# Patient Record
Sex: MALE | Race: BLACK OR AFRICAN AMERICAN | NOT HISPANIC OR LATINO
[De-identification: names, ages, dates, MRNs, and addresses within clinical notes are randomized per-mention and may not be internally consistent; named-entity substitution may affect disease eponyms.]

---

## 2021-01-19 ENCOUNTER — TRANSCRIPTION ENCOUNTER (OUTPATIENT)
Age: 66
End: 2021-01-19

## 2021-01-26 PROBLEM — Z00.00 ENCOUNTER FOR PREVENTIVE HEALTH EXAMINATION: Status: ACTIVE | Noted: 2021-01-26

## 2021-01-27 ENCOUNTER — APPOINTMENT (OUTPATIENT)
Dept: NEUROSURGERY | Facility: CLINIC | Age: 66
End: 2021-01-27

## 2021-01-27 VITALS
SYSTOLIC BLOOD PRESSURE: 166 MMHG | WEIGHT: 219 LBS | HEIGHT: 69 IN | DIASTOLIC BLOOD PRESSURE: 81 MMHG | HEART RATE: 73 BPM | BODY MASS INDEX: 32.44 KG/M2

## 2021-01-27 VITALS — TEMPERATURE: 97.1 F

## 2021-01-29 ENCOUNTER — APPOINTMENT (OUTPATIENT)
Dept: NEUROSURGERY | Facility: CLINIC | Age: 66
End: 2021-01-29
Payer: MEDICARE

## 2021-01-29 VITALS
WEIGHT: 219 LBS | BODY MASS INDEX: 32.44 KG/M2 | SYSTOLIC BLOOD PRESSURE: 146 MMHG | HEIGHT: 69 IN | HEART RATE: 70 BPM | DIASTOLIC BLOOD PRESSURE: 83 MMHG

## 2021-01-29 PROCEDURE — 99204 OFFICE O/P NEW MOD 45 MIN: CPT

## 2021-01-29 PROCEDURE — 99072 ADDL SUPL MATRL&STAF TM PHE: CPT

## 2021-01-29 RX ORDER — GABAPENTIN 100 MG/1
100 CAPSULE ORAL
Qty: 60 | Refills: 0 | Status: ACTIVE | COMMUNITY
Start: 2021-01-22

## 2021-01-29 RX ORDER — AZELASTINE HYDROCHLORIDE 137 UG/1
0.1 SPRAY, METERED NASAL
Qty: 90 | Refills: 0 | Status: ACTIVE | COMMUNITY
Start: 2020-09-23

## 2021-01-29 RX ORDER — FLUTICASONE PROPIONATE 50 UG/1
50 SPRAY, METERED NASAL
Qty: 48 | Refills: 0 | Status: ACTIVE | COMMUNITY
Start: 2020-09-23

## 2021-01-29 RX ORDER — CYCLOBENZAPRINE HYDROCHLORIDE 5 MG/1
5 TABLET, FILM COATED ORAL
Qty: 10 | Refills: 0 | Status: ACTIVE | COMMUNITY
Start: 2021-01-19

## 2021-01-29 RX ORDER — METHYLPREDNISOLONE 4 MG/1
4 TABLET ORAL
Qty: 21 | Refills: 0 | Status: ACTIVE | COMMUNITY
Start: 2021-01-19

## 2021-01-29 NOTE — HISTORY OF PRESENT ILLNESS
[Back] : back [___ wks] : [unfilled] week(s) ago [9] : a maximum pain level of 9/10 [Sharp] : sharp [Right] : right [Anterior] : anterior aspect of the [Calf] : calf [Numbness] : numbness [Laying] : laying [Standing] : standing [Walking] : walking [Sitting] : sitting [Insomnia] : insomnia [Gait Dysfunction] : gait dysfunction [Medications] : medications [FreeTextEntry2] : right shin [FreeTextEntry6] : Ibuprofen, Gabapentin, Cyclobenzaprine, Medrol pack

## 2021-01-29 NOTE — ASSESSMENT
[FreeTextEntry1] : 65 year old male with low back and lumbar radicular pain.  As he has not had any imaging of his spine, I will order an MRI lumbar spine to help delineate the exact etiology of his pain.  We will also begin a trial of medically supervised PT.  He will return to see me once he has completed the MRI to review the results and discuss his further treatment options.

## 2021-01-29 NOTE — PHYSICAL EXAM
Called patient, no answer could not leave VM [General Appearance - Alert] : alert [Mood] : the mood was normal [4] : S1 ankle flexors 4/5 [5] : S1 ankle flexors 5/5 [1+] : Patella right 1+ [2+] : Ankle jerk left 2+ [FreeTextEntry7] : diminished to LT over right L4 distribution [Straight-Leg Raise Test - Left] : straight leg raise of the left leg was negative [Straight-Leg Raise Test - Right] : straight leg raise  of the right leg was negative [Able to toe walk] : the patient was able to toe walk [Able to heel walk] : the patient was able to heel walk [No Spinal Tenderness] : no spinal tenderness [] : no rash

## 2021-02-12 ENCOUNTER — OUTPATIENT (OUTPATIENT)
Dept: OUTPATIENT SERVICES | Facility: HOSPITAL | Age: 66
LOS: 1 days | End: 2021-02-12
Payer: MEDICARE

## 2021-02-12 ENCOUNTER — APPOINTMENT (OUTPATIENT)
Dept: MRI IMAGING | Facility: CLINIC | Age: 66
End: 2021-02-12

## 2021-02-12 DIAGNOSIS — M54.16 RADICULOPATHY, LUMBAR REGION: ICD-10-CM

## 2021-02-12 PROCEDURE — 72148 MRI LUMBAR SPINE W/O DYE: CPT

## 2021-02-12 PROCEDURE — 72148 MRI LUMBAR SPINE W/O DYE: CPT | Mod: 26

## 2021-02-23 ENCOUNTER — APPOINTMENT (OUTPATIENT)
Dept: NEUROSURGERY | Facility: CLINIC | Age: 66
End: 2021-02-23
Payer: MEDICARE

## 2021-02-23 VITALS
BODY MASS INDEX: 31.25 KG/M2 | WEIGHT: 211 LBS | HEART RATE: 71 BPM | SYSTOLIC BLOOD PRESSURE: 123 MMHG | DIASTOLIC BLOOD PRESSURE: 69 MMHG | HEIGHT: 69 IN

## 2021-02-23 PROCEDURE — 99214 OFFICE O/P EST MOD 30 MIN: CPT

## 2021-02-23 PROCEDURE — 99072 ADDL SUPL MATRL&STAF TM PHE: CPT

## 2021-02-23 NOTE — DATA REVIEWED
[de-identified] : \par   MR Lumbar Spine No Cont             Final\par \par No Documents Attached\par \par \par \par \par   EXAM:  MR SPINE LUMBAR\par \par \par PROCEDURE DATE:  02/12/2021\par \par \par \par INTERPRETATION:  LUMBOSACRAL SPINE MRI\par \par CLINICAL INFORMATION: Low back pain and radiculopathy.\par TECHNIQUE: Multiplanar, multisequence MR imaging was obtained of the lumbosacral spine.\par FINDINGS:\par \par DISC LEVEL EVALUATION:\par \par L1/L2: There is mild disc degeneration and minimal disc bulging without central canal or foraminal narrowing.\par L2/L3: There is mild disc degeneration with mild to moderate circumferential disc bulging resulting in indentation upon the ventral thecal sac without significant central canal stenosis. There is mild left foraminal narrowing and mild facet arthrosis.\par L3/L4: Moderate to severe facet arthrosis is present with grade 1 anterolisthesis. There is uncovering of the posterior aspect of the disc along with a broad-based posterior disc protrusion resulting in moderate to severe central canal stenosis. There is moderate bilateral foraminal narrowing. Mild endplate marrow edema is present.\par L4/L5: There is moderate facet arthrosis with grade 1 anterolisthesis. There is uncovering of the posterior aspect of the disc along with a moderate-sized posterior disc protrusion resulting in minimal central canal stenosis with moderate to severe right and moderate left foraminal narrowing.\par L5/S1: There is a small broad-based posterior disc protrusion. There is severe right and mild left facet arthrosis. Mild foraminal narrowing is present.\par \par SPINAL ALIGNMENT: Grade 1 anterolisthesis at L3-L4 and L4-L5.\par DISTAL CORD AND CONUS: The distal cord is normal in appearance. The conus terminates at L1 and is unremarkable.\par SI JOINTS: Normal\par MARROW: Mild endplate marrow edema at L3-L4. Mild marrow edema within the bones of the left-sided facet joint at L4-L5.\par PARASPINAL MUSCLE AND SOFT TISSUES: Normal\par INTRAABDOMINAL/INTRAPELVIC SOFT TISSUES: Normal\par \par IMPRESSION: Moderate multilevel lumbar spondylosis that is most advanced at L3-L4 and L4-L5 where there is advanced facet arthrosis with grade 1 anterolisthesis resulting in uncovering of the posterior aspect of the disc as well as posterior disc protrusions. There is moderate to severe central canal stenosis at L3-L4 with mild central canal stenosis at L4-L5.\par \par Moderate bilateral foraminal narrowing is present at L3-L4 with moderate to severe right foraminal narrowing at L4-L5 and moderate left foraminal narrowing.\par \par \par \par \par \par \par ROSEMARY FRIEND MD; Attending Radiologist\par This document has been electronically signed. Feb 13 2021  6:33PM\par \par  \par \par  Ordered by: WOODROW VELIZ       Collected/Examined: [id number] 08:31PM       \par Verified by: WOODROW VELIZ [id number] 11:02AM       \par  Result Communication: Schedule appointment to discuss results;\par Stage: Final       \par  Performed at: [facility] [place]       Resulted: [id number] 06:36PM       Last Updated: 22Feb2021 11:02AM       Accession: [id number]

## 2021-02-23 NOTE — REASON FOR VISIT
[Follow-Up: _____] : a [unfilled] follow-up visit [FreeTextEntry1] : Patient returns after completing MRI lumbar spine.  He is here to review the results.  He continues to have significant lower back pain with radiation down his right leg to the shin along with numbness.

## 2021-02-23 NOTE — ASSESSMENT
[FreeTextEntry1] : 65 year old male with low back and lumbar radicular pain secondary to neural foraminal stenosis and lumbar facet arthropathy.  Given the nature of the patient's complaints and lack of significant improvement following more conservative measures, we did discuss lumbar epidural steroid injection.  Risks, benefits, and expectations of the procedure were reviewed.  The patient was provided with an educational pamphlet outlining the details of the procedure so that he/she may have the ability to review the information prior to proceeding.  The patient has agreed to proceed and will follow up with me for the procedure.\par

## 2021-02-26 ENCOUNTER — APPOINTMENT (OUTPATIENT)
Dept: DISASTER EMERGENCY | Facility: CLINIC | Age: 66
End: 2021-02-26

## 2021-02-26 ENCOUNTER — TRANSCRIPTION ENCOUNTER (OUTPATIENT)
Age: 66
End: 2021-02-26

## 2021-02-28 ENCOUNTER — TRANSCRIPTION ENCOUNTER (OUTPATIENT)
Age: 66
End: 2021-02-28

## 2021-03-01 ENCOUNTER — APPOINTMENT (OUTPATIENT)
Dept: NEUROSURGERY | Facility: CLINIC | Age: 66
End: 2021-03-01
Payer: MEDICARE

## 2021-03-01 ENCOUNTER — OUTPATIENT (OUTPATIENT)
Dept: OUTPATIENT SERVICES | Facility: HOSPITAL | Age: 66
LOS: 1 days | End: 2021-03-01
Payer: MEDICARE

## 2021-03-01 DIAGNOSIS — M54.16 RADICULOPATHY, LUMBAR REGION: ICD-10-CM

## 2021-03-01 LAB — SARS-COV-2 N GENE NPH QL NAA+PROBE: NOT DETECTED

## 2021-03-01 PROCEDURE — 62323 NJX INTERLAMINAR LMBR/SAC: CPT

## 2021-03-16 ENCOUNTER — APPOINTMENT (OUTPATIENT)
Dept: NEUROSURGERY | Facility: CLINIC | Age: 66
End: 2021-03-16
Payer: MEDICARE

## 2021-03-16 VITALS
SYSTOLIC BLOOD PRESSURE: 135 MMHG | BODY MASS INDEX: 31.25 KG/M2 | HEIGHT: 69 IN | DIASTOLIC BLOOD PRESSURE: 75 MMHG | WEIGHT: 211 LBS | HEART RATE: 61 BPM

## 2021-03-16 PROCEDURE — 99214 OFFICE O/P EST MOD 30 MIN: CPT

## 2021-03-16 PROCEDURE — 99072 ADDL SUPL MATRL&STAF TM PHE: CPT

## 2021-03-16 NOTE — REASON FOR VISIT
[Follow-Up: _____] : a [unfilled] follow-up visit [FreeTextEntry1] : Patient returns after his first LESI.  He states that he felt about 50% better for a couple of weeks but this past Sunday, his pain got significantly worse.  He is here to discuss.

## 2021-03-16 NOTE — ASSESSMENT
[FreeTextEntry1] : 65 year old male with low back and lumbar radicular pain moderately improved but only temporarily from his first LESI.  He will follow up with me next week for his second LESI.

## 2021-03-21 DIAGNOSIS — Z01.818 ENCOUNTER FOR OTHER PREPROCEDURAL EXAMINATION: ICD-10-CM

## 2021-03-22 ENCOUNTER — APPOINTMENT (OUTPATIENT)
Dept: DISASTER EMERGENCY | Facility: CLINIC | Age: 66
End: 2021-03-22

## 2021-03-24 ENCOUNTER — TRANSCRIPTION ENCOUNTER (OUTPATIENT)
Age: 66
End: 2021-03-24

## 2021-03-25 ENCOUNTER — APPOINTMENT (OUTPATIENT)
Dept: NEUROSURGERY | Facility: CLINIC | Age: 66
End: 2021-03-25
Payer: MEDICARE

## 2021-03-25 ENCOUNTER — OUTPATIENT (OUTPATIENT)
Dept: OUTPATIENT SERVICES | Facility: HOSPITAL | Age: 66
LOS: 1 days | End: 2021-03-25
Payer: MEDICARE

## 2021-03-25 DIAGNOSIS — M54.16 RADICULOPATHY, LUMBAR REGION: ICD-10-CM

## 2021-03-25 PROCEDURE — 62323 NJX INTERLAMINAR LMBR/SAC: CPT

## 2021-03-29 LAB — SARS-COV-2 N GENE NPH QL NAA+PROBE: NOT DETECTED

## 2021-04-13 ENCOUNTER — APPOINTMENT (OUTPATIENT)
Dept: NEUROSURGERY | Facility: CLINIC | Age: 66
End: 2021-04-13
Payer: MEDICARE

## 2021-04-13 VITALS
SYSTOLIC BLOOD PRESSURE: 128 MMHG | DIASTOLIC BLOOD PRESSURE: 73 MMHG | HEIGHT: 69 IN | WEIGHT: 202 LBS | BODY MASS INDEX: 29.92 KG/M2 | HEART RATE: 73 BPM

## 2021-04-13 VITALS — TEMPERATURE: 97.2 F

## 2021-04-13 DIAGNOSIS — M47.816 SPONDYLOSIS W/OUT MYELOPATHY OR RADICULOPATHY, LUMBAR REGION: ICD-10-CM

## 2021-04-13 DIAGNOSIS — M48.061 SPINAL STENOSIS, LUMBAR REGION WITHOUT NEUROGENIC CLAUDICATION: ICD-10-CM

## 2021-04-13 DIAGNOSIS — M54.16 RADICULOPATHY, LUMBAR REGION: ICD-10-CM

## 2021-04-13 DIAGNOSIS — M54.5 LOW BACK PAIN: ICD-10-CM

## 2021-04-13 PROCEDURE — 99072 ADDL SUPL MATRL&STAF TM PHE: CPT

## 2021-04-13 PROCEDURE — 99213 OFFICE O/P EST LOW 20 MIN: CPT

## 2021-04-13 NOTE — ASSESSMENT
[FreeTextEntry1] : 65 year old female with low back pain and lumbar radicular pain now significantly improved following a series of 2 LESIs.  He will continue with a course of medically supervised PT.  He will return to see me at the earliest sign that his pain returns for further injection therapy as necessary.

## 2021-06-03 NOTE — REASON FOR VISIT
[Follow-Up: _____] : a [unfilled] follow-up visit [FreeTextEntry1] : Patient returns after his second LESI a few weeks ago.  He feels about 75% improved as compared to prior to his procedures.  He is here to discuss his progress. Low Risk (score 7-11)

## 2022-03-22 ENCOUNTER — NON-APPOINTMENT (OUTPATIENT)
Age: 67
End: 2022-03-22

## 2022-04-12 ENCOUNTER — APPOINTMENT (OUTPATIENT)
Dept: ORTHOPEDIC SURGERY | Facility: CLINIC | Age: 67
End: 2022-04-12
Payer: MEDICARE

## 2022-04-12 ENCOUNTER — NON-APPOINTMENT (OUTPATIENT)
Age: 67
End: 2022-04-12

## 2022-04-12 PROCEDURE — 20605 DRAIN/INJ JOINT/BURSA W/O US: CPT | Mod: RT

## 2022-04-12 PROCEDURE — 99204 OFFICE O/P NEW MOD 45 MIN: CPT | Mod: 25

## 2022-06-02 ENCOUNTER — APPOINTMENT (OUTPATIENT)
Dept: ORTHOPEDIC SURGERY | Facility: CLINIC | Age: 67
End: 2022-06-02
Payer: MEDICARE

## 2022-06-02 PROCEDURE — 20605 DRAIN/INJ JOINT/BURSA W/O US: CPT | Mod: RT

## 2022-06-02 PROCEDURE — 99214 OFFICE O/P EST MOD 30 MIN: CPT | Mod: 25

## 2022-12-12 ENCOUNTER — RX RENEWAL (OUTPATIENT)
Age: 67
End: 2022-12-12

## 2023-03-10 ENCOUNTER — RX RENEWAL (OUTPATIENT)
Age: 68
End: 2023-03-10

## 2023-03-10 RX ORDER — MELOXICAM 15 MG/1
15 TABLET ORAL
Qty: 90 | Refills: 0 | Status: ACTIVE | COMMUNITY
Start: 2022-04-12 | End: 1900-01-01

## 2023-06-07 ENCOUNTER — RX RENEWAL (OUTPATIENT)
Age: 68
End: 2023-06-07

## 2023-06-22 ENCOUNTER — NON-APPOINTMENT (OUTPATIENT)
Age: 68
End: 2023-06-22

## 2023-07-11 ENCOUNTER — APPOINTMENT (OUTPATIENT)
Dept: ORTHOPEDIC SURGERY | Facility: CLINIC | Age: 68
End: 2023-07-11
Payer: MEDICARE

## 2023-07-11 DIAGNOSIS — M18.11 UNILATERAL PRIMARY OSTEOARTHRITIS OF FIRST CARPOMETACARPAL JOINT, RIGHT HAND: ICD-10-CM

## 2023-07-11 PROCEDURE — 20605 DRAIN/INJ JOINT/BURSA W/O US: CPT | Mod: RT

## 2023-07-11 PROCEDURE — 73130 X-RAY EXAM OF HAND: CPT | Mod: RT

## 2023-07-11 PROCEDURE — 99214 OFFICE O/P EST MOD 30 MIN: CPT | Mod: 25

## 2023-10-01 PROBLEM — M54.16 LUMBAR RADICULAR PAIN: Status: ACTIVE | Noted: 2021-01-29

## 2025-04-29 ENCOUNTER — NON-APPOINTMENT (OUTPATIENT)
Age: 70
End: 2025-04-29

## 2025-05-13 ENCOUNTER — NON-APPOINTMENT (OUTPATIENT)
Age: 70
End: 2025-05-13

## 2025-05-14 ENCOUNTER — APPOINTMENT (OUTPATIENT)
Dept: PHYSICAL MEDICINE AND REHAB | Facility: CLINIC | Age: 70
End: 2025-05-14
Payer: MEDICARE

## 2025-05-14 PROCEDURE — 99204 OFFICE O/P NEW MOD 45 MIN: CPT

## 2025-05-14 RX ORDER — TAMSULOSIN HYDROCHLORIDE 0.4 MG/1
0.4 CAPSULE ORAL
Refills: 0 | Status: ACTIVE | COMMUNITY

## 2025-05-14 RX ORDER — EMPAGLIFLOZIN 10 MG/1
10 TABLET, FILM COATED ORAL
Refills: 0 | Status: ACTIVE | COMMUNITY

## 2025-05-14 RX ORDER — ROSUVASTATIN CALCIUM 10 MG/1
10 TABLET, FILM COATED ORAL
Refills: 0 | Status: ACTIVE | COMMUNITY

## 2025-05-14 RX ORDER — CARVEDILOL 12.5 MG/1
12.5 TABLET, FILM COATED ORAL
Refills: 0 | Status: ACTIVE | COMMUNITY

## 2025-05-14 RX ORDER — SACUBITRIL AND VALSARTAN 49; 51 MG/1; MG/1
49-51 TABLET, FILM COATED ORAL
Refills: 0 | Status: ACTIVE | COMMUNITY

## 2025-05-15 ENCOUNTER — APPOINTMENT (OUTPATIENT)
Dept: PHYSICAL MEDICINE AND REHAB | Facility: CLINIC | Age: 70
End: 2025-05-15
Payer: MEDICARE

## 2025-05-15 ENCOUNTER — OUTPATIENT (OUTPATIENT)
Dept: OUTPATIENT SERVICES | Facility: HOSPITAL | Age: 70
LOS: 1 days | End: 2025-05-15
Payer: MEDICARE

## 2025-05-15 DIAGNOSIS — M47.816 SPONDYLOSIS W/OUT MYELOPATHY OR RADICULOPATHY, LUMBAR REGION: ICD-10-CM

## 2025-05-15 DIAGNOSIS — M54.50 LOW BACK PAIN, UNSPECIFIED: ICD-10-CM

## 2025-05-15 DIAGNOSIS — M54.16 RADICULOPATHY, LUMBAR REGION: ICD-10-CM

## 2025-05-15 DIAGNOSIS — M48.061 SPINAL STENOSIS, LUMBAR REGION WITHOUT NEUROGENIC CLAUDICATION: ICD-10-CM

## 2025-05-15 PROCEDURE — 62323 NJX INTERLAMINAR LMBR/SAC: CPT

## 2025-05-16 DIAGNOSIS — M54.16 RADICULOPATHY, LUMBAR REGION: ICD-10-CM

## 2025-06-03 ENCOUNTER — APPOINTMENT (OUTPATIENT)
Dept: PHYSICAL MEDICINE AND REHAB | Facility: CLINIC | Age: 70
End: 2025-06-03
Payer: MEDICARE

## 2025-06-03 PROCEDURE — 99214 OFFICE O/P EST MOD 30 MIN: CPT

## 2025-06-09 ENCOUNTER — OUTPATIENT (OUTPATIENT)
Dept: OUTPATIENT SERVICES | Facility: HOSPITAL | Age: 70
LOS: 1 days | End: 2025-06-09
Payer: MEDICARE

## 2025-06-09 ENCOUNTER — APPOINTMENT (OUTPATIENT)
Dept: PHYSICAL MEDICINE AND REHAB | Facility: CLINIC | Age: 70
End: 2025-06-09
Payer: MEDICARE

## 2025-06-09 DIAGNOSIS — M54.16 RADICULOPATHY, LUMBAR REGION: ICD-10-CM

## 2025-06-09 PROCEDURE — 62323 NJX INTERLAMINAR LMBR/SAC: CPT

## 2025-07-08 ENCOUNTER — APPOINTMENT (OUTPATIENT)
Dept: PHYSICAL MEDICINE AND REHAB | Facility: CLINIC | Age: 70
End: 2025-07-08
Payer: MEDICARE

## 2025-07-08 PROCEDURE — 99213 OFFICE O/P EST LOW 20 MIN: CPT
